# Patient Record
Sex: FEMALE | Race: WHITE | Employment: OTHER | URBAN - METROPOLITAN AREA
[De-identification: names, ages, dates, MRNs, and addresses within clinical notes are randomized per-mention and may not be internally consistent; named-entity substitution may affect disease eponyms.]

---

## 2020-03-04 NOTE — TELEPHONE ENCOUNTER
Per pt has questions regarding a procedure, cartiva implant. Is wanting to know how many cases he has had, wanting an overview. Was referred by Dr. Tanya Tong colleague,Dr. Jennifer Buchanan.  Please advise

## 2020-03-08 NOTE — TELEPHONE ENCOUNTER
Please let the patient know that I no longer use the Cartee of a I have not like the results I usually recommend george-implant but I would have to evaluate the patient first take x-rays and assess and see exactly what type of surgery she needs thank you

## 2020-03-09 NOTE — TELEPHONE ENCOUNTER
S/w pt. She is picking her old records today, will bring to her appt.  Time and address confirmed for Thursday appt

## 2020-03-09 NOTE — TELEPHONE ENCOUNTER
I called the patient and I spoke to her extensively about what she had done. She has an appointment with me this Thursday should get as much clinical information from her former doctor.

## 2020-03-09 NOTE — TELEPHONE ENCOUNTER
Dr Bhavana Pathak - pls see below    S/w pt. Relayed Dr Toro Goldstein. Pt is requesting phone call from Dr Bhavana Pathak to discuss as she is a good friend of Dr. Velia Freeman.   Pt is asking about the permanence of a george implant vs cartiva, why not cartiva, what

## 2023-09-10 NOTE — PROGRESS NOTES
Loree Plaza is a 58year old female. Patient presents with: Foot Pain: Right great toe- bone on bone- pain comes and goes- rates pain 4/10 . HPI:   This very pleasant patient presents to the clinic with a chief complaint of right great toe pain its been coming and going about 4-10 but can get worse with prolonged standing and walking she also complains of a bump on the side of her great toe joint on the right foot she does have a history of previous cheilectomy with another physician. She relates that she had the cheilectomy done pain got worse and she developed arthritis at the great toe joint. At today's visit reviewed nurse's history as taken above, allergies medications and medical history as documented below. All changes duly noted  Allergies: Patient has no known allergies. Current Outpatient Medications   Medication Sig Dispense Refill    LORazepam (ATIVAN) 2 MG Oral Tab Take 1 tablet (2 mg total) by mouth every 6 (six) hours as needed. Tazarotene (TAZORAC EX) Apply  topically. Citalopram Hydrobromide (CELEXA) 40 MG Oral Tab Take 40 mg by mouth daily. mupirocin (BACTROBAN) 2 % Apply Externally Ointment Mix with triamcinolone ointment and apply bid 30 g 1    triamcinolone acetonide (KENALOG) 0.1 % Apply Externally Ointment Apply to aa bid ( mix with mupirocin) 90 g 2    trazodone (DESYREL) 50 MG Oral Tab Take 50 mg by mouth nightly. Clobetasol Propionate (CLOBEX SPRAY EX) Apply  topically. History reviewed. No pertinent past medical history. History reviewed. No pertinent surgical history. History reviewed. No pertinent family history.    Social History    Socioeconomic History      Marital status: Single          REVIEW OF SYSTEMS:   Today reviewed systens as documented below  GENERAL HEALTH: feels well otherwise  SKIN: Refer to exam below  RESPIRATORY: denies shortness of breath with exertion  CARDIOVASCULAR: denies chest pain on exertion  GI: denies abdominal pain and denies heartburn  NEURO: denies headaches    EXAM:   There were no vitals taken for this visit. GENERAL: well developed, well nourished, in no apparent distress  EXTREMITIES:   1. Integument: The skin on the right foot was evaluated its warm and dry she has a surgical scar over the first MTP joint. She has a mildly enlarged medial eminence of the first metatarsal head as well. She has some swelling around the great toe joint. 2. Vascular: Patient has palpable pulses dorsalis pedis and posterior tibial on the right   3. Neurologic: Patient has intact sensorium   4. Musculoskeletal: Patient has pain and extremely limited range of motion of the first MTP joint of the right foot which is guarding and splinting secondary to pain. There is crepitation noted. The first MTP joint of the left foot also shows some guarding and splinting but not as severe range of motion is relatively good there. X-rays were taken AP and lateral showing osteoarthritic changes of the first MTP joint the dorsal eminence of the first metatarsal has been removed so that looks moved and the joint is a squared off type which has a propensity for developing osteoarthritis. The sesamoids are in a fairly good position. But the medial sesamoid seems to have some mottled appearance on both the AP and the lateral views there is some osteoarthritic changes noted to the sesamoid. X-rays were taken 2 views of the left foot again showing some mild osteoarthritic changes of the first MTP joint. 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 In addition to that my recommendation here would be for a fusion of the first MTP joint on the right foot with plate and screws. This is for several reasons:  First there is significant osteoarthritic changes noted to the first MTP joint of the right foot. This is especially present on the lateral aspect where there is osteophytic spurring of the joint. I discussed with her that a Kelechi implant is probably not going to be beneficial because that will increase the motion of the first MTP joint which is what she is desirous of however the sesamoid is arthritic and this would increase plantar pain and swelling eventually again requiring a fusion. The nature and extent of the fusion procedure the possible complications and risks postoperative recovery and necessity for nonweightbearing in the length of time for nonweightbearing were all reviewed. Questions were invited and answered the patient can call me back with questions or MyChart message me. The patient indicates understanding of these issues and agrees to the plan.     Sebastien Villanueva DPM

## 2023-09-19 NOTE — TELEPHONE ENCOUNTER
Received fax from [facility] for Prior authorization started for tramadol HCI 50mg tablets.  Please advise

## 2023-09-20 NOTE — TELEPHONE ENCOUNTER
Received another fax from [facility] with note that pt is in severe pain and would like to switch medications. Sent fax to clinical staff. Please advise.

## 2023-10-19 NOTE — TELEPHONE ENCOUNTER
Pt calling asking for refill on the medication pt leaving for out of town in the morning 11 am please advise

## 2023-10-19 NOTE — TELEPHONE ENCOUNTER
Rx request for Tramadol. Please review and sign off if appropriate. Thank you.     Last seen: 9/6/23  Last refill: 9/21/23 # 30 with 0 reflls

## 2023-10-19 NOTE — TELEPHONE ENCOUNTER
Rx request for tramadol. Please review and sign off if appropriate. Thank you.     Last seen: 9/6/23  Last refill: 9/19/23 # 30 with 0 refills

## 2023-10-25 NOTE — TELEPHONE ENCOUNTER
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

## 2023-10-25 NOTE — TELEPHONE ENCOUNTER
Patient calling to follow up on getting her refill today. Patient states that she is out of her medication for 1 week now and that she has sent message through [address] [place] [address] [phone] and called our office a few times. Patient wants to know why it is taking about a week? Patient is very upset and wants a call back from someone higher and who cares.

## 2023-10-26 NOTE — TELEPHONE ENCOUNTER
Spoke to the patient and she she expressed that she would like her medication in more timely manner. I apologized for the delayed in the refill of the medication. Patient verbalized understanding.

## 2023-11-29 NOTE — TELEPHONE ENCOUNTER
Patient calling for medication refill   TRAMADOL 50 MG Oral Tab   And would like to speak to the supervisor regarding medication refill that she had call since Friday and hasn't got her refill. Pt mention Juan Hopkins.   Please advise

## 2023-12-01 NOTE — TELEPHONE ENCOUNTER
Last rx given on 10/25 #30 no refill  Last seen on 9/6/23  Pt scheduled for sx on 2/28.    Please advise on refill

## 2023-12-04 NOTE — TELEPHONE ENCOUNTER
Fax received from [address] [place] [address] stating prior authorization has been started for Tramadol HCI 50 MG. Please advise.

## 2023-12-04 NOTE — TELEPHONE ENCOUNTER
Called [place] and they informed me that the tramadol does not require a prior auth and insurance covered the med and ready for  for pt. Disregard PA fax request. Nothing further needed.

## 2023-12-11 NOTE — TELEPHONE ENCOUNTER
From: Latisha Long  To: Wang Barney  Sent: 12/10/2023 6:15 PM CST  Subject: Question re: Tramadol for Dr Jara    Can Dr Jara or his nurse please call me? Thank you.

## 2023-12-11 NOTE — TELEPHONE ENCOUNTER
S/w pt and she states her last refill request took 10 days to fill and wants to know why it took so long. I apologized for the delay as her last request came in during thanksgiving week and we had longer than normal wait times. She wanted to know the best method to contact us in the future and I advised she can send a mychart encounter but if she has a more urgent concern she should call the office. She verbalized understanding.

## 2024-01-05 NOTE — TELEPHONE ENCOUNTER
Pt calling asking for a refill on the following medication,Pt only wants to deal with Payal only states she wants her to do because payal said to call her only said doesn't want it to take 10 days. Pt wants payal to call her back please advise      traMADol 50 MG Oral Tab

## 2024-01-05 NOTE — TELEPHONE ENCOUNTER
Last seen on 9/6/23  Last rx given on 12/3/23 #30 no refill  Pt scheduled for right foot sx on 2/28/23.  Please advise on refill request

## 2024-02-20 RX ORDER — LORAZEPAM 1 MG/1
1 TABLET ORAL AS NEEDED
COMMUNITY

## 2024-02-20 RX ORDER — MIRTAZAPINE 15 MG/1
15 TABLET, FILM COATED ORAL NIGHTLY
COMMUNITY

## 2024-02-20 RX ORDER — TRAMADOL HYDROCHLORIDE 50 MG/1
50 TABLET ORAL EVERY 6 HOURS PRN
Status: ON HOLD | COMMUNITY
End: 2024-02-28

## 2024-02-20 RX ORDER — QUETIAPINE FUMARATE 25 MG/1
25 TABLET, FILM COATED ORAL AS NEEDED
COMMUNITY

## 2024-02-20 RX ORDER — MULTIVIT-MIN/IRON FUM/FOLIC AC 7.5 MG-4
1 TABLET ORAL DAILY
COMMUNITY

## 2024-02-27 ENCOUNTER — ANESTHESIA EVENT (OUTPATIENT)
Dept: SURGERY | Facility: HOSPITAL | Age: 64
End: 2024-02-27
Payer: MEDICAID

## 2024-02-27 NOTE — DISCHARGE INSTRUCTIONS
HOME INSTRUCTIONS  Keep cast clean and dry. You can shower in 24 hours, make sure to cover the cast so it remains dry. Non-weight bearing on the right foot. Use crutches until your follow-up appointment. Watch for any signs of infection. Shortness of breathe, pain not being controlled by the medication, any excessive drainage coming from the cast. Keep an eye out on the toes, make sure they have good circulation.   AMBSURG HOME CARE INSTRUCTIONS: POST-OP ANESTHESIA  The medication that you received for sedation or general anesthesia can last up to 24 hours. Your judgment and reflexes may be altered, even if you feel like your normal self.      We Recommend:   Do not drive any motor vehicle or bicycle   Avoid mowing the lawn, playing sports, or working with power tools/applicances (power saws, electric knives or mixers)   That you have someone stay with you on your first night home   Do not drink alcohol or take sleeping pills or tranquilizers   Do not sign legal documents within 24 hours of your procedure   If you had a nerve block for your surgery, take extra care not to put any pressure on your arm or hand for 24 hours    It is normal:  For you to have a sore throat if you had a breathing tube during surgery (while you were asleep!). The sore throat should get better within 48 hours. You can gargle with warm salt water (1/2 tsp in 4 oz warm water) or use a throat lozenge for comfort  To feel muscle aches or soreness especially in the abdomen, chest or neck. The achy feeling should go away in the next 24 hours  To feel weak, sleepy or \"wiped out\". Your should start feeling better in the next 24 hours.   To experience mild discomforts such as sore lip or tongue, headache, cramps, gas pains or a bloated feeling in your abdomen.   To experience mild back pain or soreness for a day or two if you had spinal or epidural anesthesia.   If you had laparoscopic surgery, to feel shoulder pain or discomfort on the day of  surgery.   For some patients to have nausea after surgery/anesthesia    If you feel nausea or experience vomiting:   Try to move around less.   Eat less than usual or drink only liquids until the next morning   Nausea should resolve in about 24 hours    If you have a problem when you are at home:    Call your surgeons office   Discharge Instructions: After Your Surgery  You’ve just had surgery. During surgery, you were given medicine called anesthesia to keep you relaxed and free of pain. After surgery, you may have some pain or nausea. This is common. Here are some tips for feeling better and getting well after surgery.   Going home  Your healthcare provider will show you how to take care of yourself when you go home. They'll also answer your questions. Have an adult family member or friend drive you home. For the first 24 hours after your surgery:   Don't drive or use heavy equipment.  Don't make important decisions or sign legal papers.  Take medicines as directed.  Don't drink alcohol.  Have someone stay with you, if needed. They can watch for problems and help keep you safe.  Be sure to go to all follow-up visits with your healthcare provider. And rest after your surgery for as long as your provider tells you to.   Coping with pain  If you have pain after surgery, pain medicine will help you feel better. Take it as directed, before pain becomes severe. Also, ask your healthcare provider or pharmacist about other ways to control pain. This might be with heat, ice, or relaxation. And follow any other instructions your surgeon or nurse gives you.      Stay on schedule with your medicine.     Tips for taking pain medicine  To get the best relief possible, remember these points:   Pain medicines can upset your stomach. Taking them with a little food may help.  Most pain relievers taken by mouth need at least 20 to 30 minutes to start to work.  Don't wait till your pain becomes severe before you take your medicine.  Try to time your medicine so that you can take it before starting an activity. This might be before you get dressed, go for a walk, or sit down for dinner.  Constipation is a common side effect of some pain medicines. Call your healthcare provider before taking any medicines such as laxatives or stool softeners to help ease constipation. Also ask if you should skip any foods. Drinking lots of fluids and eating foods such as fruits and vegetables that are high in fiber can also help. Remember, don't take laxatives unless your surgeon has prescribed them.  Drinking alcohol and taking pain medicine can cause dizziness and slow your breathing. It can even be deadly. Don't drink alcohol while taking pain medicine.  Pain medicine can make you react more slowly to things. Don't drive or run machinery while taking pain medicine.  Your healthcare provider may tell you to take acetaminophen to help ease your pain. Ask them how much you're supposed to take each day. Acetaminophen or other pain relievers may interact with your prescription medicines or other over-the-counter (OTC) medicines. Some prescription medicines have acetaminophen and other ingredients in them. Using both prescription and OTC acetaminophen for pain can cause you to accidentally overdose. Read the labels on your OTC medicines with care. This will help you to clearly know the list of ingredients, how much to take, and any warnings. It may also help you not take too much acetaminophen. If you have questions or don't understand the information, ask your pharmacist or healthcare provider to explain it to you before you take the OTC medicine.   Managing nausea  Some people have an upset stomach (nausea) after surgery. This is often because of anesthesia, pain, or pain medicine, less movement of food in the stomach, or the stress of surgery. These tips will help you handle nausea and eat healthy foods as you get better. If you were on a special food plan before  surgery, ask your healthcare provider if you should follow it while you get better. Check with your provider on how your eating should progress. It may depend on the surgery you had. These general tips may help:   Don't push yourself to eat. Your body will tell you when to eat and how much.  Start off with clear liquids and soup. They're easier to digest.  Next try semi-solid foods as you feel ready. These include mashed potatoes, applesauce, and gelatin.  Slowly move to solid foods. Don’t eat fatty, rich, or spicy foods at first.  Don't force yourself to have 3 large meals a day. Instead eat smaller amounts more often.  Take pain medicines with a small amount of solid food, such as crackers or toast. This helps prevent nausea.  When to call your healthcare provider  Call your healthcare provider right away if you have any of these:   You still have too much pain, or the pain gets worse, after taking the medicine. The medicine may not be strong enough. Or there may be a complication from the surgery.  You feel too sleepy, dizzy, or groggy. The medicine may be too strong.  Side effects such as nausea or vomiting. Your healthcare provider may advise taking other medicines to .  Skin changes such as rash, itching, or hives. This may mean you have an allergic reaction. Your provider may advise taking other medicines.  The incision looks different (for instance, part of it opens up).  Bleeding or fluid leaking from the incision site, and weren't told to expect that.  Fever of 100.4°F (38°C) or higher, or as directed by your provider.  Call 911  Call 911 right away if you have:   Trouble breathing  Facial swelling    If you have obstructive sleep apnea   You were given anesthesia medicine during surgery to keep you comfortable and free of pain. After surgery, you may have more apnea spells because of this medicine and other medicines you were given. The spells may last longer than normal.    At home:  Keep using the  continuous positive airway pressure (CPAP) device when you sleep. Unless your healthcare provider tells you not to, use it when you sleep, day or night. CPAP is a common device used to treat obstructive sleep apnea.  Talk with your provider before taking any pain medicine, muscle relaxants, or sedatives. Your provider will tell you about the possible dangers of taking these medicines.  Contact your provider if your sleeping changes a lot even when taking medicines as directed.  Lee last reviewed this educational content on 10/1/2021  © 2143-2161 [employer]. All rights reserved. This information is not intended as a substitute for professional medical care. Always follow your healthcare professional's instructions.

## 2024-02-28 ENCOUNTER — HOSPITAL ENCOUNTER (EMERGENCY)
Facility: HOSPITAL | Age: 64
Discharge: HOME | End: 2024-02-28
Attending: EMERGENCY MEDICINE
Payer: MEDICAID

## 2024-02-28 ENCOUNTER — HOSPITAL ENCOUNTER (OUTPATIENT)
Facility: HOSPITAL | Age: 64
Setting detail: HOSPITAL OUTPATIENT SURGERY
Discharge: HOME | End: 2024-02-28
Attending: PODIATRIST | Admitting: PODIATRIST
Payer: MEDICAID

## 2024-02-28 ENCOUNTER — APPOINTMENT (OUTPATIENT)
Dept: GENERAL RADIOLOGY | Facility: HOSPITAL | Age: 64
End: 2024-02-28
Attending: PODIATRIST
Payer: MEDICAID

## 2024-02-28 ENCOUNTER — TELEPHONE (OUTPATIENT)
Dept: PODIATRY CLINIC | Facility: CLINIC | Age: 64
End: 2024-02-28

## 2024-02-28 ENCOUNTER — ANESTHESIA (OUTPATIENT)
Dept: SURGERY | Facility: HOSPITAL | Age: 64
End: 2024-02-28
Payer: MEDICAID

## 2024-02-28 VITALS
HEART RATE: 64 BPM | SYSTOLIC BLOOD PRESSURE: 121 MMHG | OXYGEN SATURATION: 98 % | TEMPERATURE: 99 F | BODY MASS INDEX: 24.86 KG/M2 | DIASTOLIC BLOOD PRESSURE: 79 MMHG | HEIGHT: 68 IN | RESPIRATION RATE: 16 BRPM | WEIGHT: 164 LBS

## 2024-02-28 VITALS
DIASTOLIC BLOOD PRESSURE: 85 MMHG | OXYGEN SATURATION: 99 % | RESPIRATION RATE: 18 BRPM | HEART RATE: 82 BPM | SYSTOLIC BLOOD PRESSURE: 144 MMHG | TEMPERATURE: 98 F

## 2024-02-28 DIAGNOSIS — Z98.890 STATUS POST FOOT SURGERY: Primary | ICD-10-CM

## 2024-02-28 DIAGNOSIS — G89.18 POSTOPERATIVE PAIN: Primary | ICD-10-CM

## 2024-02-28 DIAGNOSIS — G89.18 ACUTE POSTOPERATIVE PAIN: ICD-10-CM

## 2024-02-28 PROCEDURE — 76000 FLUOROSCOPY <1 HR PHYS/QHP: CPT | Performed by: PODIATRIST

## 2024-02-28 PROCEDURE — 96374 THER/PROPH/DIAG INJ IV PUSH: CPT

## 2024-02-28 PROCEDURE — 28750 FUSION OF BIG TOE JOINT: CPT | Performed by: PODIATRIST

## 2024-02-28 PROCEDURE — 0QBN0ZZ EXCISION OF RIGHT METATARSAL, OPEN APPROACH: ICD-10-PCS | Performed by: PODIATRIST

## 2024-02-28 PROCEDURE — 0SGM04Z FUSION OF RIGHT METATARSAL-PHALANGEAL JOINT WITH INTERNAL FIXATION DEVICE, OPEN APPROACH: ICD-10-PCS | Performed by: PODIATRIST

## 2024-02-28 PROCEDURE — 99284 EMERGENCY DEPT VISIT MOD MDM: CPT

## 2024-02-28 PROCEDURE — 0QSN04Z REPOSITION RIGHT METATARSAL WITH INTERNAL FIXATION DEVICE, OPEN APPROACH: ICD-10-PCS | Performed by: PODIATRIST

## 2024-02-28 PROCEDURE — 96375 TX/PRO/DX INJ NEW DRUG ADDON: CPT

## 2024-02-28 DEVICE — SCREW BNE 3.5X14MM ANK CORT FT LOK ORTHOLOC: Type: IMPLANTABLE DEVICE | Site: TOE | Status: FUNCTIONAL

## 2024-02-28 DEVICE — PROPEL PUTTY, SMALL
Type: IMPLANTABLE DEVICE | Site: TOE | Status: FUNCTIONAL
Brand: PROPEL

## 2024-02-28 DEVICE — IMPLANTABLE DEVICE: Type: IMPLANTABLE DEVICE | Site: TOE

## 2024-02-28 DEVICE — HOLDING PIN
Type: IMPLANTABLE DEVICE | Site: TOE
Brand: ANCHORAGE

## 2024-02-28 DEVICE — IMPLANTABLE DEVICE: Type: IMPLANTABLE DEVICE | Site: TOE | Status: FUNCTIONAL

## 2024-02-28 RX ORDER — BUPIVACAINE HYDROCHLORIDE 5 MG/ML
INJECTION, SOLUTION EPIDURAL; INTRACAUDAL AS NEEDED
Status: DISCONTINUED | OUTPATIENT
Start: 2024-02-28 | End: 2024-02-28 | Stop reason: HOSPADM

## 2024-02-28 RX ORDER — ACETAMINOPHEN 500 MG
1000 TABLET ORAL ONCE
Status: COMPLETED | OUTPATIENT
Start: 2024-02-28 | End: 2024-02-28

## 2024-02-28 RX ORDER — SODIUM CHLORIDE, SODIUM LACTATE, POTASSIUM CHLORIDE, CALCIUM CHLORIDE 600; 310; 30; 20 MG/100ML; MG/100ML; MG/100ML; MG/100ML
INJECTION, SOLUTION INTRAVENOUS CONTINUOUS
Status: DISCONTINUED | OUTPATIENT
Start: 2024-02-28 | End: 2024-02-28

## 2024-02-28 RX ORDER — AMOXICILLIN AND CLAVULANATE POTASSIUM 875; 125 MG/1; MG/1
1 TABLET, FILM COATED ORAL 2 TIMES DAILY
Qty: 14 TABLET | Refills: 0 | Status: SHIPPED
Start: 2024-02-28

## 2024-02-28 RX ORDER — HYDROMORPHONE HYDROCHLORIDE 1 MG/ML
0.2 INJECTION, SOLUTION INTRAMUSCULAR; INTRAVENOUS; SUBCUTANEOUS EVERY 5 MIN PRN
Status: DISCONTINUED | OUTPATIENT
Start: 2024-02-28 | End: 2024-02-28

## 2024-02-28 RX ORDER — HYDROMORPHONE HYDROCHLORIDE 1 MG/ML
0.5 INJECTION, SOLUTION INTRAMUSCULAR; INTRAVENOUS; SUBCUTANEOUS ONCE
Status: COMPLETED | OUTPATIENT
Start: 2024-02-28 | End: 2024-02-28

## 2024-02-28 RX ORDER — KETOROLAC TROMETHAMINE 15 MG/ML
15 INJECTION, SOLUTION INTRAMUSCULAR; INTRAVENOUS ONCE
Status: COMPLETED | OUTPATIENT
Start: 2024-02-28 | End: 2024-02-28

## 2024-02-28 RX ORDER — ORPHENADRINE CITRATE 30 MG/ML
30 INJECTION INTRAMUSCULAR; INTRAVENOUS ONCE
Status: COMPLETED | OUTPATIENT
Start: 2024-02-28 | End: 2024-02-28

## 2024-02-28 RX ORDER — OXYCODONE AND ACETAMINOPHEN 10; 325 MG/1; MG/1
TABLET ORAL EVERY 6 HOURS PRN
Qty: 10 TABLET | Refills: 0 | Status: SHIPPED | OUTPATIENT
Start: 2024-02-28 | End: 2024-03-04

## 2024-02-28 RX ORDER — NALOXONE HYDROCHLORIDE 0.4 MG/ML
80 INJECTION, SOLUTION INTRAMUSCULAR; INTRAVENOUS; SUBCUTANEOUS AS NEEDED
Status: DISCONTINUED | OUTPATIENT
Start: 2024-02-28 | End: 2024-02-28

## 2024-02-28 RX ORDER — KETOROLAC TROMETHAMINE 10 MG/1
10 TABLET, FILM COATED ORAL EVERY 6 HOURS PRN
Qty: 20 TABLET | Refills: 0 | Status: SHIPPED | OUTPATIENT
Start: 2024-02-28 | End: 2024-03-06

## 2024-02-28 RX ORDER — MIDAZOLAM HYDROCHLORIDE 1 MG/ML
INJECTION INTRAMUSCULAR; INTRAVENOUS AS NEEDED
Status: DISCONTINUED | OUTPATIENT
Start: 2024-02-28 | End: 2024-02-28 | Stop reason: SURG

## 2024-02-28 RX ORDER — HYDROCODONE BITARTRATE AND ACETAMINOPHEN 5; 325 MG/1; MG/1
1 TABLET ORAL EVERY 6 HOURS PRN
Qty: 30 TABLET | Refills: 0 | Status: SHIPPED | OUTPATIENT
Start: 2024-02-28

## 2024-02-28 RX ORDER — LIDOCAINE HYDROCHLORIDE 10 MG/ML
INJECTION, SOLUTION EPIDURAL; INFILTRATION; INTRACAUDAL; PERINEURAL AS NEEDED
Status: DISCONTINUED | OUTPATIENT
Start: 2024-02-28 | End: 2024-02-28 | Stop reason: SURG

## 2024-02-28 RX ORDER — CEFAZOLIN SODIUM/WATER 2 G/20 ML
2 SYRINGE (ML) INTRAVENOUS ONCE
Status: COMPLETED | OUTPATIENT
Start: 2024-02-28 | End: 2024-02-28

## 2024-02-28 RX ADMIN — CEFAZOLIN SODIUM/WATER 2 G: 2 G/20 ML SYRINGE (ML) INTRAVENOUS at 07:38:00

## 2024-02-28 RX ADMIN — SODIUM CHLORIDE, SODIUM LACTATE, POTASSIUM CHLORIDE, CALCIUM CHLORIDE: 600; 310; 30; 20 INJECTION, SOLUTION INTRAVENOUS at 07:37:00

## 2024-02-28 RX ADMIN — SODIUM CHLORIDE, SODIUM LACTATE, POTASSIUM CHLORIDE, CALCIUM CHLORIDE: 600; 310; 30; 20 INJECTION, SOLUTION INTRAVENOUS at 09:18:00

## 2024-02-28 RX ADMIN — LIDOCAINE HYDROCHLORIDE 2 ML: 10 INJECTION, SOLUTION EPIDURAL; INFILTRATION; INTRACAUDAL; PERINEURAL at 07:37:00

## 2024-02-28 RX ADMIN — MIDAZOLAM HYDROCHLORIDE 2 MG: 1 INJECTION INTRAMUSCULAR; INTRAVENOUS at 07:37:00

## 2024-02-28 NOTE — ANESTHESIA PREPROCEDURE EVALUATION
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 [M19.079]    Relevant Problems   No relevant active problems       NPO:  Last Liquid Consumption Date: 24  Last Liquid Consumption Time:   Last Solid Consumption Date: 24  Last Solid Consumption Time:   Last Liquid Consumption Date: 24          History Review:  Patient Active Problem List    Diagnosis Date Noted    Allergic contact dermatitis due to cosmetics 2013    Other psoriasis 2013       Past Medical History:   Diagnosis Date    Anxiety state     Depression     Dry eye     Visual impairment     glasses       Past Surgical History:   Procedure Laterality Date          FOOT SURGERY Right     IMPLANT LEFT      IMPLANT RIGHT         Medications Prior to Admission   Medication Sig Dispense Refill Last Dose    Multiple Vitamins-Minerals (MULTI-VITAMIN/MINERALS) Oral Tab Take 1 tablet by mouth daily.   2024 at 0800    Pyridoxine HCl (VITAMIN B-6 OR) Take 1 tablet by mouth daily.   2024 at 0800    mirtazapine 15 MG Oral Tab Take 1 tablet (15 mg total) by mouth nightly.   2024 at 0800    LORazepam 1 MG Oral Tab Take 1 tablet (1 mg total) by mouth as needed for Anxiety.   2024    QUEtiapine 25 MG Oral Tab Take 1 tablet (25 mg total) by mouth as needed.   2024 at 2100    traMADol 50 MG Oral Tab Take 1 tablet (50 mg total) by mouth every 6 (six) hours as needed for Pain.   2024    traMADol 50 MG Oral Tab Take 1 tablet (50 mg total) by mouth every 12 (twelve) hours as needed for Pain. 30 tablet 0  2/26/2024    traMADol 50 MG Oral Tab Take 1 tablet (50 mg total) by mouth every 12 (twelve) hours as needed for Pain. 30 tablet 0     tramadol-acetaminophen 37.5-325 MG Oral Tab Take 1 tablet by mouth every 6 (six) hours as needed for Pain. 30 tablet 0      Current Facility-Administered Medications Ordered in Epic   Medication Dose Route Frequency Provider Last Rate Last Admin    lactated ringers infusion   Intravenous Continuous Ming Barney DPM        acetaminophen (Tylenol Extra Strength) tab 1,000 mg  1,000 mg Oral Once Ming Barney DPM        ceFAZolin (Ancef) 2 g in 20mL IV syringe premix  2 g Intravenous Once Ming Barney DPM         No current [facility]-ordered outpatient medications on file.       Allergies   Allergen Reactions    Latex UNKNOWN     Added based on information entered during case entry, please review and add reactions, type, and severity as needed, per patient not an allergy       Family History   Problem Relation Age of Onset    Heart Disorder Father      Social History     Socioeconomic History    Marital status: Single   Tobacco Use    Smoking status: Never    Smokeless tobacco: Never   Vaping Use    Vaping Use: Never used   Substance and Sexual Activity    Alcohol use: Yes     Comment: 3 drinks monthly    Drug use: Not Currently       Available pre-op labs reviewed.             Vital Signs:  Body mass index is 24.94 kg/m².   height is 1.727 m (5' 8\") and weight is 74.4 kg (164 lb). Her oral temperature is 98.2 °F (36.8 °C). Her blood pressure is 150/73 and her pulse is 86. Her respiration is 20 and oxygen saturation is 93%.   Vitals:    02/20/24 1459 02/28/24 0659   BP:  150/73   Pulse:  86   Resp:  20   Temp:  98.2 °F (36.8 °C)   TempSrc:  Oral   SpO2:  93%   Weight: 76.2 kg (168 lb) 74.4 kg (164 lb)   Height: 1.727 m (5' 8\") 1.727 m (5' 8\")        Anesthesia Evaluation     Patient summary reviewed and Nursing notes reviewed    Airway   Mallampati: III  Dental - Dentition  appears grossly intact     Pulmonary - negative ROS    breath sounds clear to auscultation  Cardiovascular - negative ROS  Exercise tolerance: good    Rhythm: regular  Rate: normal    Neuro/Psych    (+)  anxiety/panic attacks,  depression      GI/Hepatic/Renal - negative ROS     Endo/Other - negative ROS   Abdominal                  Anesthesia Plan:   ASA:  2  Plan:   General  Plan Comments: Deep sedation vs GA discussed. Pt ok with either but prefers DS if surgically possible.  Will discuss with [place].  Informed Consent Plan and Risks Discussed With:  Patient      I have informed Hui Lizama Juliana Wes and/or legal guardian or family member of the nature of the anesthetic plan, benefits, risks including possible dental damage if relevant, major complications, and any alternative forms of anesthetic management.   All of the patient's questions were answered to the best of my ability. The patient desires the anesthetic management as planned.  JUAN KIRBY MD  2/28/2024 7:11 AM  Present on Admission:  **None**

## 2024-02-28 NOTE — TELEPHONE ENCOUNTER
Per pt had surgery this morning, took 2 hydrocodone at 1:30pm and her foot is still throbbing and asking if she can take another. Please advise

## 2024-02-28 NOTE — ANESTHESIA POSTPROCEDURE EVALUATION
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    [facility] AN Post Evaluation:   Patient Evaluated in PACU  Patient Participation: complete - patient participated  Level of Consciousness: awake  Pain Score: 0  Pain Management: adequate  Airway Patency:patent  Dental exam unchanged from preop  Yes    Cardiovascular Status: acceptable  Respiratory Status: acceptable  Postoperative Hydration acceptable      JUAN KIRBY MD  2/28/2024 9:32 AM

## 2024-02-28 NOTE — TELEPHONE ENCOUNTER
S/w patient- she states that she had surgery today and foot is starting to throb. She took 2 norcos at 1:30pm. She is wondering if she can take advil or aleve.    S/w Dr Barney and he states that aleve or advil would be good option in between taking norco.    S/w patient and informed her that she could take the advil or aleve in the in between norco. She should also ice and elevate the foot. She states that it was more painful than she thought. I informed her that pain will start to improve over the next week as the swelling goes down. She had no other questions at this time.    TONEY

## 2024-02-28 NOTE — BRIEF OP NOTE
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     Specimen: None     Estimated Blood Loss: 20 cc    Dictation Number: In epic    Ming Barney DPM  2/28/2024  9:40 AM

## 2024-02-28 NOTE — OPERATIVE REPORT
[facility]     OPERATIVE REPORT    Hui Harvey    CSN [id number] MRN [id number]    10/19/1960 Age 63 year old   Admission Date 2024 Operation Date 2024   Attending Physician Rashmi story. providers found Operating Physician Ming Barney, DPM   PCP SAQIB CULLEN             PREOPERATIVE DIAGNOSIS:    Hallux limitus first metatarsal phalangeal joint right foot capsulitis first metatarsal phalangeal joint right foot  Arthritis of first metatarsal phalangeal joint of the great toe, right foot  POSTOPERATIVE DIAGNOSIS: Same  PROCEDURE:    Arthrodesis of the first metatarsal phalangeal joint utilizing a [employer] 6-hole plate with locking screws and 1 nonlocking for the compression's slot.  And insertion of 1 cc of DBM  Application of below the knee fiberglass cast right lower extremity with bivalving    ANESTHESIA:  Local with light sedation, utilizing 0.5% Marcaine plain.  16 cc via medial foot block and posterior tibial nerve block right foot     HEMOSTASIS: Pneumatic ankle tourniquet inflated to 250 mmHg following exsanguination Chaim's bandage right lower extremity     ESTIMATED BLOOD LOSS: 20 cc     INDICATIONS:  This 63 year old female presented with patient had a cheilectomy procedure was failed.  She was met in preoperative holding so we could again review her postoperative recovery necessity for nonweightbearing until instructed otherwise.  FINDINGS: Diffuse full-thickness erosions over the adjacent sides of the metatarsal phalangeal joint over 70% and partial thickness on the rest.  The articular cartilage overall was discolored.     SPECIMENS: None     COMPLICATIONS:  None.     DRAINS: None     OPERATIVE TECHNIQUE:    The patient was brought into the operating with vital signs stable and placed in a supine position on the operating table with all personnel present and the implants necessary for the fusion were in the room along with the representative from the [employer]  company.  Timeout was then taken there were no additions deletions or concerns reported.  The patient was placed under intravenous sedation and the right foot was anesthetized as above then prepped and draped using usual aseptic technique and hemostasis was achieved as above.  Medial linear incision was made beginning midshaft over the first metatarsal medial to the tendon of extensor hallucis longus in line with the previous cheilectomy incision.  The incision was lengthened and carried over the proximal phalanx to the level of the head.  The incision was deepened using both sharp and blunt technique superficial veins were ligated cauterized and/or retracted as necessary deep vital structures were underscored and Safely retracted during the course of the procedure.  Next a capsular and periosteal incision was made the full length of the skin incision and the capsular and periosteal structures were denuded from the first metatarsal dorsally medially laterally and plantarly using a McGlamry elevator.  The tissues were denuded from the base of the proximal phalanx dorsally medially and laterally and any exposed bone was resected utilizing a rongeur's.  Next utilizing the reaming system guidewire was driven on the first metatarsal head where the reaming system was used to create around peg like structure for the metatarsal head and was drilled then with a 2 mm drill bit for fenestration.  Reaming system was then used on the base of the phalanx where slightly more bone was taken to accommodate for the fact that the patient wanted to be able to have better dorsiflexion.  He was then fenestrated with a 2 mm drill bit on the base of the phalanx.  At this point in time the areas flushed with copious amounts of saline resection sites look to be good on fluoroscopy and was then fixated with a plate which had a 10 degree dorsiflexion bend.  3 screws were placed into the base of the phalanx.  And then the compression screw was  inserted proximally and while maintaining compression that screw was tightened and the fusion site compressed very nicely.  2 other stability screws were put in the remaining holes of the plate.  An interfrag screw was not thrown.  He was used to visualize correction was found to be very good that image was saved and sent to radiology.  The subcutaneous tissues and periosteal and capsular structures were reapproximated and maintained utilizing 3-0 Vicryl suture in simple interrupted fashion.  Skin edges were reapproximated maintained using 3-0 nylon suture in a simple interrupted fashion.  A sterile postoperative dressing was applied with Xeroform sterile gauze 3 inch Bre..  At this point in time the medic ankle tourniquet been released earlier and hemostasis had been controlled with electrocautery.  A below the knee fiberglass cast was then applied and bivalved to accommodate for any swelling that could occur.  The patient tolerated the above anesthesia procedure well left the operating with vital signs stable and the vascular status of her right foot intact to recovery room via cart.    Ming Barney DPM

## 2024-02-29 NOTE — ED INITIAL ASSESSMENT (HPI)
Pt to ED for post op pain to right foot. Per pt, she had right foot surgery earlier today and is in severe pain. Pt has sensation, cap refill <2 seconds, right leg is in cast.

## 2024-02-29 NOTE — TELEPHONE ENCOUNTER
I had the patient go to the [facility] ER for evaluation because her pain was so severe.  They switched her to Percocet and Toradol.  I have since talked to the patient and she is doing better.

## 2024-02-29 NOTE — ED PROVIDER NOTES
Patient Seen in: [facility] Emergency Department      History     Chief Complaint   Patient presents with    Postop/Procedure Problem     Stated Complaint: post-op pain    Subjective:   HPI    63-year-old female presents for evaluation for postoperative pain.  Patient had surgery on her right foot today and has been having severe pain.  She was prescribed hydrocodone 5-325.  She has been taking up to 2 of these at a time and is still having severe pain.  Pain comes in waves.  Pain is throbbing.    Objective:   Past Medical History:   Diagnosis Date    Anxiety state     Depression     Dry eye     Visual impairment     glasses              Past Surgical History:   Procedure Laterality Date          FOOT SURGERY Right     IMPLANT LEFT      IMPLANT RIGHT                  Social History     Socioeconomic History    Marital status: Single   Tobacco Use    Smoking status: Never    Smokeless tobacco: Never   Vaping Use    Vaping Use: Never used   Substance and Sexual Activity    Alcohol use: Yes     Comment: 3 drinks monthly    Drug use: Not Currently              Review of Systems    Positive for stated complaint: post-op pain  Other systems are as noted in HPI.  Constitutional and vital signs reviewed.      All other systems reviewed and negative except as noted above.    Physical Exam     ED Triage Vitals [24]   BP (!) 151/99   Pulse 88   Resp 18   Temp 98.4 °F (36.9 °C)   Temp src Temporal   SpO2 96 %   O2 Device None (Room air)       Current:/85   Pulse 82   Temp 98.4 °F (36.9 °C) (Temporal)   Resp 18   SpO2 99%         Physical Exam  Vitals and nursing note reviewed.   Constitutional:       Appearance: Normal appearance. She is well-developed. She is not ill-appearing or diaphoretic.   HENT:      Head: Normocephalic and atraumatic.      Right Ear: External ear normal.      Left Ear: External ear normal.      Nose: Nose normal. No nasal deformity.      Mouth/Throat:      Lips:  Pink.   Eyes:      General: Lids are normal.      Extraocular Movements: Extraocular movements intact.   Pulmonary:      Effort: Pulmonary effort is normal. No respiratory distress.   Musculoskeletal:         General: No deformity. Normal range of motion.      Right foot: Normal capillary refill. Normal pulse.      Comments: Capillary refill is normal to the toes.  The lower extremity is in a cast which has been clamshelled.    Skin:     General: Skin is dry.      Coloration: Skin is not cyanotic or pale.   Neurological:      General: No focal deficit present.      Mental Status: She is alert and oriented to person, place, and time.      Gait: Gait is intact.   Psychiatric:         Attention and Perception: Attention normal.         Mood and Affect: Mood normal.         Speech: Speech normal.               ED Course   Labs Reviewed - No data to display                   MDM                                         Medical Decision Making  Differential diagnosis includes but is not limited to postoperative pain, tight cast, etc    Patient had surgery today, infection would be unlikely.  Patient saw the podiatrist and had the cast clamshell to relieve any pressure and is still having pain.  She was given some Dilaudid, ketorolac and some Norflex for possible muscular spasm in the ED.  She is to stop taking the hydrocodone and is given a prescription for Percocets.      Problems Addressed:  Postoperative pain: acute illness or injury    Amount and/or Complexity of Data Reviewed  Discussion of management or test interpretation with external provider(s): Spoke with Dr. Barney who did the surgery and reports that the surgery went well, there was no infection and that he clamshelled the cast.  He would like the patient to get some dilaudid.          Risk  Prescription drug management.  Parenteral controlled substances.  Risk Details: dilaudid        Disposition and Plan     Clinical Impression:  1. Postoperative pain          Disposition:  Discharge  2/28/2024 11:05 pm    Follow-up:  Ming Barney DPM  [address] [address]  [phone]    Follow up            Medications Prescribed:  Current Discharge Medication List        START taking these medications    Details   oxyCODONE-acetaminophen  MG Oral Tab Take 0.5-1 tablets by mouth every 6 (six) hours as needed for Pain.  Qty: 10 tablet, Refills: 0    Associated Diagnoses: Postoperative pain      Ketorolac Tromethamine 10 MG Oral Tab Take 1 tablet (10 mg total) by mouth every 6 (six) hours as needed for Pain.  Qty: 20 tablet, Refills: 0    Comments: Patient received ketorolac IV in the ED.

## 2024-03-01 ENCOUNTER — TELEPHONE (OUTPATIENT)
Dept: PODIATRY CLINIC | Facility: CLINIC | Age: 64
End: 2024-03-01

## 2024-03-01 NOTE — TELEPHONE ENCOUNTER
Procedure date:  How are you feeling?   Any bleeding?    Is the dressing dry & intact?   Level of pain?   Character of pain:   Onset of pain:  Aggravating factors:  Duration of pain:  Alleviating factors:  Are you taking the prescribed medication?   Are you following all of the PO instructions?     Other Comments:    Follow-up appt  date:     Pt was advised if they have any concerns after hours to call our office and they would be directed to on call physician.

## 2024-03-01 NOTE — TELEPHONE ENCOUNTER
Procedure date:2/8/2024  How are you feeling? /Same day as the surgery went to the ED for pain control  Any bleeding? / no   Is the dressing dry & intact? /yes  Level of pain? / 6  Character of pain: did not say.   Onset of pain:/day of surgery  Aggravating factors:/ going down 30 stairs  Duration of pain:/ until she elevates her leg and foot  Alleviating factors: no weight bearing      Are you taking the prescribed medication? / This is a very conflicting dilemma/ She has two narcotics and toradol at home and wants  more narcotics to cover the weekend. Until her appt. I explained that her insurance may not cover all those opiods in the past 2 days. She said she had been texting you so I hope you have an answer for her. I suggested 2 norco if she runs out of oxycodone.  She is very insistant  Are you following all of the PO instructions?/ appetite fine    Other Comments:She has 30 stairs from the bedroom to the kitchen and her kids will be leaving in the next few days    Follow-up appt  date:3/6/2024     Pt was advised if they have any concerns after hours to call our office and they would be directed to on call physician. / DONE

## 2024-03-06 ENCOUNTER — OFFICE VISIT (OUTPATIENT)
Dept: PODIATRY CLINIC | Facility: CLINIC | Age: 64
End: 2024-03-06
Payer: MEDICAID

## 2024-03-06 DIAGNOSIS — Z98.890 STATUS POST FOOT SURGERY: Primary | ICD-10-CM

## 2024-03-06 PROCEDURE — 99024 POSTOP FOLLOW-UP VISIT: CPT | Performed by: PODIATRIST

## 2024-03-08 ENCOUNTER — TELEPHONE (OUTPATIENT)
Dept: PODIATRY CLINIC | Facility: CLINIC | Age: 64
End: 2024-03-08

## 2024-03-08 NOTE — TELEPHONE ENCOUNTER
Called pt and she states she made an appt for 3/18 and she s/w Dr Barney already and he confirmed that 3/18 appt is ok and she does not require sooner appt. She will call back if any concerns.

## 2024-03-08 NOTE — TELEPHONE ENCOUNTER
Patient states that she is not able to keep Wednesday 3/13/24 post op, due to a  that she needs to attend, so the patient has schedule another appointment for Monday 3/18/24, which is first available. Should the patient be seen sooner around 12:30pm?

## 2024-03-10 NOTE — PROGRESS NOTES
Hui Harvey is a 63 year old female.   Chief Complaint   Patient presents with    Post-Op     Post op right foot- pain 6/10 at rest.          HPI:   Patient presents 1 week status post surgery she had communicated with me several times about severe pain still having fairly severe pain even 6 out of 10 despite being nonweightbearing in a cast.  At today's visit reviewed nurse's history as taken above, allergies medications and medical history as documented below.  All changes duly noted  Allergies: Patient has no active allergies.   Current Outpatient Medications   Medication Sig Dispense Refill    HYDROcodone-acetaminophen 5-325 MG Oral Tab Take 1 tablet by mouth every 6 (six) hours as needed for Pain. 30 tablet 0    amoxicillin clavulanate 875-125 MG Oral Tab Take 1 tablet by mouth 2 (two) times daily. 14 tablet 0    HYDROcodone-acetaminophen 5-325 MG Oral Tab Take 1 tablet by mouth every 6 (six) hours as needed for Pain. 30 tablet 0    Multiple Vitamins-Minerals (MULTI-VITAMIN/MINERALS) Oral Tab Take 1 tablet by mouth daily.      Pyridoxine HCl (VITAMIN B-6 OR) Take 1 tablet by mouth daily.      mirtazapine 15 MG Oral Tab Take 1 tablet (15 mg total) by mouth nightly.      QUEtiapine 25 MG Oral Tab Take 1 tablet (25 mg total) by mouth as needed.      LORazepam 1 MG Oral Tab Take 1 tablet (1 mg total) by mouth as needed for Anxiety.        Past Medical History:   Diagnosis Date    Anxiety state     Depression     Dry eye     Visual impairment     glasses      Past Surgical History:   Procedure Laterality Date          FOOT SURGERY Right     IMPLANT LEFT      IMPLANT RIGHT        Family History   Problem Relation Age of Onset    Heart Disorder Father       Social History     Socioeconomic History    Marital status: Single   Tobacco Use    Smoking status: Never    Smokeless tobacco: Never   Vaping Use    Vaping Use: Never used   Substance and Sexual Activity    Alcohol use: Yes     Comment: 3  drinks monthly    Drug use: Not Currently           REVIEW OF SYSTEMS:   Today reviewed systens as documented below  GENERAL HEALTH: feels well otherwise  SKIN: Refer to exam below  RESPIRATORY: denies shortness of breath with exertion  CARDIOVASCULAR: denies chest pain on exertion  GI: denies abdominal pain and denies heartburn  NEURO: denies headaches    EXAM:   There were no vitals taken for this visit.  GENERAL: well developed, well nourished, in no apparent distress  EXTREMITIES:   The cast was removed the dressing was removed she does have diffuse bruising around the area she has some shortening of the hallux as well.  Which I did discuss with her.  In addition to that she has a small blister in the first webspace which is superficial has no purulence.  ASSESSMENT AND PLAN:   Diagnoses and all orders for this visit:    Status post foot surgery        Plan: Aseptic dressing reapplied patient was placed in a knee-high cam boot.  Patient will stay off the foot and keep it elevated continue with all restrictions and follow-up with us again in 1 week for postop check possible suture removal and x-ray.  Patient must remain strict nonweightbearing.    The patient indicates understanding of these issues and agrees to the plan.    Ming Barney DPM

## 2024-03-18 ENCOUNTER — OFFICE VISIT (OUTPATIENT)
Dept: PODIATRY CLINIC | Facility: CLINIC | Age: 64
End: 2024-03-18
Payer: MEDICAID

## 2024-03-18 DIAGNOSIS — G89.18 POSTOPERATIVE PAIN OF EXTREMITY: Primary | ICD-10-CM

## 2024-03-18 DIAGNOSIS — M79.609 POSTOPERATIVE PAIN OF EXTREMITY: Primary | ICD-10-CM

## 2024-03-18 RX ORDER — TRAMADOL HYDROCHLORIDE 50 MG/1
50 TABLET ORAL EVERY 12 HOURS PRN
Qty: 30 TABLET | Refills: 0 | Status: SHIPPED | OUTPATIENT
Start: 2024-03-18

## 2024-03-18 NOTE — PROGRESS NOTES
Hui Harvey is a 63 year old female.   Chief Complaint   Patient presents with    Post-Op     2nd pov- right foot-rates pain 6.5/10 - electric shock pain-          HPI:   Patient returns to the clinic now 2-1/2 weeks status post surgery she missed her last appointment because of job interviews.  Her friend Baldo who identifies himself as an  presents with her.  At today's visit reviewed nurse's history as taken above, allergies medications and medical history as documented below.  All changes duly noted  Allergies: Patient has no active allergies.   Current Outpatient Medications   Medication Sig Dispense Refill    traMADol 50 MG Oral Tab Take 1 tablet (50 mg total) by mouth every 12 (twelve) hours as needed for Pain. 30 tablet 0    HYDROcodone-acetaminophen 5-325 MG Oral Tab Take 1 tablet by mouth every 6 (six) hours as needed for Pain. 30 tablet 0    HYDROcodone-acetaminophen 5-325 MG Oral Tab Take 1 tablet by mouth every 6 (six) hours as needed for Pain. 30 tablet 0    Multiple Vitamins-Minerals (MULTI-VITAMIN/MINERALS) Oral Tab Take 1 tablet by mouth daily.      Pyridoxine HCl (VITAMIN B-6 OR) Take 1 tablet by mouth daily.      mirtazapine 15 MG Oral Tab Take 1 tablet (15 mg total) by mouth nightly.      QUEtiapine 25 MG Oral Tab Take 1 tablet (25 mg total) by mouth as needed.      amoxicillin clavulanate 875-125 MG Oral Tab Take 1 tablet by mouth 2 (two) times daily. 14 tablet 0    LORazepam 1 MG Oral Tab Take 1 tablet (1 mg total) by mouth as needed for Anxiety.        Past Medical History:   Diagnosis Date    Anxiety state     Depression     Dry eye     Visual impairment     glasses      Past Surgical History:   Procedure Laterality Date          FOOT SURGERY Right     IMPLANT LEFT      IMPLANT RIGHT        Family History   Problem Relation Age of Onset    Heart Disorder Father       Social History     Socioeconomic History    Marital status: Single   Tobacco Use    Smoking  status: Never    Smokeless tobacco: Never   Vaping Use    Vaping Use: Never used   Substance and Sexual Activity    Alcohol use: Yes     Comment: 3 drinks monthly    Drug use: Not Currently           REVIEW OF SYSTEMS:   Today reviewed systens as documented below  GENERAL HEALTH: feels well otherwise  SKIN: Refer to exam below  RESPIRATORY: denies shortness of breath with exertion  CARDIOVASCULAR: denies chest pain on exertion  GI: denies abdominal pain and denies heartburn  NEURO: denies headaches    EXAM:   There were no vitals taken for this visit.  GENERAL: well developed, well nourished, in no apparent distress  EXTREMITIES:     Is well coapted sutures removed no dehiscence no erythema no edema no purulence is [id number] edema is markedly decreased.  Motion palpated at the fusion site patient still has some odd sensations here and there which I contribute to swelling which is still there but not totally gone.  X-rays were taken taken 3 views they show the fusion site to be well-approximated small little bone fragments next to it not uncommon explained that to the patient and her friend who accompanied her.  ASSESSMENT AND PLAN:   Diagnoses and all orders for this visit:    Postoperative pain of extremity  -     traMADol 50 MG Oral Tab; Take 1 tablet (50 mg total) by mouth every 12 (twelve) hours as needed for Pain.    Other orders  -     [facility] AMB POD XR - RT FOOT 2 VIEWS(AP, LATERAL)WT BEARING        Plan: The patient states that she has been walking on the heel without the boot she cannot seem to manage using the boot she tried to use the shorter boot that she had from her previous cheilectomy encouraged her not to do that because that boot will not immobilize the ankle and therefore will allow her to put pressure on the ball of her foot which could disrupt the surgery.  Recommended she continue in the tall boot for right now we will see her in another 3 or 4 weeks see how she is doing.  But she can call me  if she has any questions or text me.  Applied an aseptic dressing which she can remove in a few days and start using a nylon anklet we did dispense 2 of them to her today.  In addition to that if she notices any problems she will come to the clinic sooner but I highly encouraged her not to walk barefoot on the heel as she has been doing.  The patient indicates understanding of these issues and agrees to the plan.    Ming Barney DPM

## 2024-04-25 ENCOUNTER — OFFICE VISIT (OUTPATIENT)
Dept: PODIATRY CLINIC | Facility: CLINIC | Age: 64
End: 2024-04-25
Payer: MEDICAID

## 2024-04-25 DIAGNOSIS — Z98.890 STATUS POST FOOT SURGERY: Primary | ICD-10-CM

## 2024-04-25 PROCEDURE — 99024 POSTOP FOLLOW-UP VISIT: CPT | Performed by: PODIATRIST

## 2024-04-26 DIAGNOSIS — G89.18 POSTOPERATIVE PAIN OF EXTREMITY: ICD-10-CM

## 2024-04-26 DIAGNOSIS — M79.609 POSTOPERATIVE PAIN OF EXTREMITY: ICD-10-CM

## 2024-04-26 RX ORDER — TRAMADOL HYDROCHLORIDE 50 MG/1
50 TABLET ORAL EVERY 12 HOURS PRN
Qty: 30 TABLET | Refills: 0 | Status: SHIPPED | OUTPATIENT
Start: 2024-04-26

## 2024-04-26 RX ORDER — TRAMADOL HYDROCHLORIDE 50 MG/1
50 TABLET ORAL EVERY 12 HOURS PRN
Qty: 30 TABLET | Refills: 0 | OUTPATIENT
Start: 2024-04-26

## 2024-04-26 NOTE — PROGRESS NOTES
Hui Harvey is a 63 year old female.   Chief Complaint   Patient presents with    Post-Op     R foot sx on 2024- rates pain 6/10 depends w/ activity-  per pt she hit her R 2nd toe in the stool bar yesterday- stated it's looks bruised but per pt it looks bruised even before the injury         HPI:   Patient returns to the clinic still having some postop pain not severe she is complaining of numbness and some swelling.  At today's visit reviewed nurse's history as taken above, allergies medications and medical history as documented below.  All changes duly noted  Allergies: Patient has no active allergies.   Current Outpatient Medications   Medication Sig Dispense Refill    traMADol 50 MG Oral Tab Take 1 tablet (50 mg total) by mouth every 12 (twelve) hours as needed for Pain. 30 tablet 0    traMADol 50 MG Oral Tab Take 1 tablet (50 mg total) by mouth every 12 (twelve) hours as needed for Pain. 30 tablet 0    HYDROcodone-acetaminophen 5-325 MG Oral Tab Take 1 tablet by mouth every 6 (six) hours as needed for Pain. 30 tablet 0    amoxicillin clavulanate 875-125 MG Oral Tab Take 1 tablet by mouth 2 (two) times daily. 14 tablet 0    HYDROcodone-acetaminophen 5-325 MG Oral Tab Take 1 tablet by mouth every 6 (six) hours as needed for Pain. 30 tablet 0    Multiple Vitamins-Minerals (MULTI-VITAMIN/MINERALS) Oral Tab Take 1 tablet by mouth daily.      Pyridoxine HCl (VITAMIN B-6 OR) Take 1 tablet by mouth daily.      mirtazapine 15 MG Oral Tab Take 1 tablet (15 mg total) by mouth nightly.      LORazepam 1 MG Oral Tab Take 1 tablet (1 mg total) by mouth as needed for Anxiety.      QUEtiapine 25 MG Oral Tab Take 1 tablet (25 mg total) by mouth as needed.        Past Medical History:    Anxiety state    Depression    Dry eye    Visual impairment    glasses      Past Surgical History:   Procedure Laterality Date          Foot surgery Right     Implant left      Implant right        Family History    Problem Relation Age of Onset    Heart Disorder Father       Social History     Socioeconomic History    Marital status: Single   Tobacco Use    Smoking status: Never    Smokeless tobacco: Never   Vaping Use    Vaping status: Never Used   Substance and Sexual Activity    Alcohol use: Yes     Comment: 3 drinks monthly    Drug use: Not Currently           REVIEW OF SYSTEMS:   Today reviewed systens as documented below  GENERAL HEALTH: feels well otherwise  SKIN: Refer to exam below  RESPIRATORY: denies shortness of breath with exertion  CARDIOVASCULAR: denies chest pain on exertion  GI: denies abdominal pain and denies heartburn  NEURO: denies headaches    EXAM:   There were no vitals taken for this visit.  GENERAL: well developed, well nourished, in no apparent distress  EXTREMITIES:   Skin incision is healing uneventfully there is no sign of infection mild swelling still present but remarkably improved since the last time I saw him.  It is progressing.      ASSESSMENT AND PLAN:   Diagnoses and all orders for this visit:    Status post foot surgery  -     traMADol 50 MG Oral Tab; Take 1 tablet (50 mg total) by mouth every 12 (twelve) hours as needed for Pain.    Other orders  -     [facility] AMB POD XR - RT FOOT 3 VIEWS(AP,LAT,OBLIQUE)WT BEARING        Plan: Patient can ambulate in an athletic shoe x-rays were taken today AP and lateral and oblique plate is well adhered to the bony surface no displacement of the fusion site is noted and is progressing towards healing.  Patient can ambulate in an athletic shoe at this time on the heel or side of her foot and progressed to full weightbearing after a few weeks.  For right now if she notices any swelling or problems she will go back to the boot otherwise I will see her in 1 month for a checkup refill tramadol today.    The patient indicates understanding of these issues and agrees to the plan.    Ming Barney DPM

## 2024-05-25 ENCOUNTER — OFFICE VISIT (OUTPATIENT)
Dept: PODIATRY CLINIC | Facility: CLINIC | Age: 64
End: 2024-05-25

## 2024-05-25 DIAGNOSIS — Z98.890 STATUS POST FOOT SURGERY: Primary | ICD-10-CM

## 2024-05-25 PROCEDURE — 99024 POSTOP FOLLOW-UP VISIT: CPT | Performed by: PODIATRIST

## 2024-06-02 NOTE — PROGRESS NOTES
Hui Harvey is a 63 year old female.   Chief Complaint   Patient presents with    Post-Op     Right foot- swollen- numbness- rates pain 5/10 - feels like there is a rope on 2nd and 3rd toe- 2nd toe is black and blue- taking a beating from walking differently-swelling goes up and down- patient states she is out of the boot and elevate the foot          HPI:   Returns to clinic still having some discomfort up to 5 out of 10 she feels like her second toe is black and blue and taken of bleeding from walking differently.  At today's visit reviewed nurse's history as taken above, allergies medications and medical history as documented below.  All changes duly noted  Allergies: Patient has no active allergies.   Current Outpatient Medications   Medication Sig Dispense Refill    traMADol 50 MG Oral Tab Take 1 tablet (50 mg total) by mouth every 12 (twelve) hours as needed for Pain. 30 tablet 0    HYDROcodone-acetaminophen 5-325 MG Oral Tab Take 1 tablet by mouth every 6 (six) hours as needed for Pain. 30 tablet 0    HYDROcodone-acetaminophen 5-325 MG Oral Tab Take 1 tablet by mouth every 6 (six) hours as needed for Pain. 30 tablet 0    Multiple Vitamins-Minerals (MULTI-VITAMIN/MINERALS) Oral Tab Take 1 tablet by mouth daily.      Pyridoxine HCl (VITAMIN B-6 OR) Take 1 tablet by mouth daily.      mirtazapine 15 MG Oral Tab Take 1 tablet (15 mg total) by mouth nightly.      QUEtiapine 25 MG Oral Tab Take 1 tablet (25 mg total) by mouth as needed.      traMADol 50 MG Oral Tab Take 1 tablet (50 mg total) by mouth every 12 (twelve) hours as needed for Pain. (Patient not taking: Reported on 5/25/2024) 30 tablet 0    amoxicillin clavulanate 875-125 MG Oral Tab Take 1 tablet by mouth 2 (two) times daily. 14 tablet 0    LORazepam 1 MG Oral Tab Take 1 tablet (1 mg total) by mouth as needed for Anxiety.        Past Medical History:    Anxiety state    Depression    Dry eye    Visual impairment    glasses      Past Surgical  History:   Procedure Laterality Date          Foot surgery Right     Implant left      Implant right        Family History   Problem Relation Age of Onset    Heart Disorder Father       Social History     Socioeconomic History    Marital status: Single   Tobacco Use    Smoking status: Never    Smokeless tobacco: Never   Vaping Use    Vaping status: Never Used   Substance and Sexual Activity    Alcohol use: Yes     Comment: 3 drinks monthly    Drug use: Not Currently           REVIEW OF SYSTEMS:   Today reviewed systens as documented below  GENERAL HEALTH: feels well otherwise  SKIN: Refer to exam below  RESPIRATORY: denies shortness of breath with exertion  CARDIOVASCULAR: denies chest pain on exertion  GI: denies abdominal pain and denies heartburn  NEURO: denies headaches    EXAM:   There were no vitals taken for this visit.  GENERAL: well developed, well nourished, in no apparent distress  EXTREMITIES:   The skin incision is well-healed there is no sign of an infection.  The fusion site was palpated there is no motion noted.  X-rays were taken 3 views reviewed with the patient fusion is in good's spot it is not distracted it is healing well the resorption at the fusion site the plate and screws are not elevating 1 screw is a little elevated proximally but overall they are all in good position  ASSESSMENT AND PLAN:   There are no diagnoses linked to this encounter.    Plan: Patient can ice if she wishes to but does not need to anymore she will wear a comfortable athletic shoe at this time progress to heels possibly in a month she can ambulate as tolerated she can walk 1 mile distance while wearing a good athletic shoe she can use her [employer] bike.  Follow-up with us again maybe in another month or 2.    The patient indicates understanding of these issues and agrees to the plan.    Ming Barney DPM

## 2024-06-14 DIAGNOSIS — Z98.890 STATUS POST FOOT SURGERY: ICD-10-CM

## 2024-06-15 RX ORDER — TRAMADOL HYDROCHLORIDE 50 MG/1
50 TABLET ORAL EVERY 12 HOURS PRN
Qty: 30 TABLET | Refills: 0 | Status: SHIPPED | OUTPATIENT
Start: 2024-06-15

## 2024-06-17 ENCOUNTER — TELEPHONE (OUTPATIENT)
Dept: PODIATRY CLINIC | Facility: CLINIC | Age: 64
End: 2024-06-17

## 2024-06-17 ENCOUNTER — PATIENT MESSAGE (OUTPATIENT)
Dept: PODIATRY CLINIC | Facility: CLINIC | Age: 64
End: 2024-06-17

## 2024-06-17 DIAGNOSIS — M79.609 POSTOPERATIVE PAIN OF EXTREMITY: ICD-10-CM

## 2024-06-17 DIAGNOSIS — Z98.890 STATUS POST FOOT SURGERY: ICD-10-CM

## 2024-06-17 DIAGNOSIS — Z98.890 STATUS POST FOOT SURGERY: Primary | ICD-10-CM

## 2024-06-17 DIAGNOSIS — G89.18 POSTOPERATIVE PAIN OF EXTREMITY: ICD-10-CM

## 2024-06-17 RX ORDER — TRAMADOL HYDROCHLORIDE 50 MG/1
50 TABLET ORAL EVERY 12 HOURS PRN
Qty: 30 TABLET | Refills: 0 | OUTPATIENT
Start: 2024-06-17

## 2024-06-17 NOTE — TELEPHONE ENCOUNTER
Tramadol was approved on 6/15 but rx did not go through as script printed, but should be electronic. Please resend.,

## 2024-06-18 RX ORDER — TRAMADOL HYDROCHLORIDE 50 MG/1
50 TABLET ORAL EVERY 12 HOURS PRN
Qty: 30 TABLET | Refills: 0 | Status: SHIPPED | OUTPATIENT
Start: 2024-06-18

## 2024-06-18 NOTE — TELEPHONE ENCOUNTER
From: Hui Harvey  To: Ming Barney  Sent: 6/17/2024 3:00 PM CDT  Subject: New Ins for Tramadol Refill Request     Hello Dr. Barney,    Thanks so much for calling in the tramadol. It was denied because I have new insurance. Here’s the new information. Please call me if you have any questions if not run it through my new insurance.    Hui Andrews”    Mbr ID for [facility] Benefits:  [id number]    RxBIN: [id number]  RxGRP: [id number]    Please confirm receipt when this goes through please or call me.    -Hui  [phone]

## 2024-06-19 RX ORDER — TRAMADOL HYDROCHLORIDE 50 MG/1
50 TABLET ORAL EVERY 12 HOURS PRN
Qty: 30 TABLET | Refills: 0 | OUTPATIENT
Start: 2024-06-19

## 2024-07-25 DIAGNOSIS — G89.18 POSTOPERATIVE PAIN OF EXTREMITY: ICD-10-CM

## 2024-07-25 DIAGNOSIS — M79.609 POSTOPERATIVE PAIN OF EXTREMITY: ICD-10-CM

## 2024-07-26 RX ORDER — TRAMADOL HYDROCHLORIDE 50 MG/1
50 TABLET ORAL EVERY 12 HOURS PRN
Qty: 30 TABLET | Refills: 0 | Status: SHIPPED | OUTPATIENT
Start: 2024-07-26

## 2024-08-19 ENCOUNTER — TELEPHONE (OUTPATIENT)
Dept: PODIATRY CLINIC | Facility: CLINIC | Age: 64
End: 2024-08-19

## 2024-08-19 DIAGNOSIS — Z98.890 STATUS POST FOOT SURGERY: ICD-10-CM

## 2024-08-19 DIAGNOSIS — M79.609 POSTOPERATIVE PAIN OF EXTREMITY: Primary | ICD-10-CM

## 2024-08-19 DIAGNOSIS — G89.18 POSTOPERATIVE PAIN OF EXTREMITY: Primary | ICD-10-CM

## 2024-08-19 RX ORDER — TRAMADOL HYDROCHLORIDE 50 MG/1
50 TABLET ORAL EVERY 12 HOURS PRN
Qty: 30 TABLET | Refills: 0 | Status: SHIPPED | OUTPATIENT
Start: 2024-08-19

## 2024-08-19 NOTE — TELEPHONE ENCOUNTER
Patient called stated she left her prescription in [place] at a hotel she had called and they were unable to locate patient gets postoperative pain at night desires a refill

## (undated) DEVICE — Device

## (undated) DEVICE — BANDAGE,GAUZE,BULKEE II,4.5"X4.1YD,STRL: Brand: MEDLINE

## (undated) DEVICE — 3M™ IOBAN™ 2 ANTIMICROBIAL INCISE DRAPE 6650EZ: Brand: IOBAN™ 2

## (undated) DEVICE — DISPOSABLE TOURNIQUET CUFF SINGLE BLADDER, DUAL PORT AND QUICK CONNECT CONNECTOR: Brand: COLOR CUFF

## (undated) DEVICE — SOLUTION IRRIG 1000ML 0.9% NACL USP BTL

## (undated) DEVICE — PRECISION OFFSET (9.0 X 0.51 X 25.0MM)

## (undated) DEVICE — UNDYED BRAIDED (POLYGLACTIN 910), SYNTHETIC ABSORBABLE SUTURE: Brand: COATED VICRYL

## (undated) DEVICE — STANDARD HYPODERMIC NEEDLE,POLYPROPYLENE HUB: Brand: MONOJECT

## (undated) DEVICE — STERILE TETRA-FLEX CF, ELASTIC BANDAGE, 4" X 5.5YD: Brand: TETRA-FLEX™CF

## (undated) DEVICE — SUT ETHLN 3-0 30IN FS-1 NABSRB BLK 24MM 3/8 C

## (undated) DEVICE — C-ARMOR C-ARM EQUIPMENT COVERS CLEAR STERILE UNIVERSAL FIT 12 PER CASE: Brand: C-ARMOR

## (undated) DEVICE — CONCAVE SURFACING REAMER

## (undated) DEVICE — GAMMEX® NON-LATEX PI ORTHO SIZE 7.5, STERILE POLYISOPRENE POWDER-FREE SURGICAL GLOVE: Brand: GAMMEX

## (undated) DEVICE — CONVEX REAMER

## (undated) DEVICE — INTENDED FOR TISSUE SEPARATION, AND OTHER PROCEDURES THAT REQUIRE A SHARP SURGICAL BLADE TO PUNCTURE OR CUT.: Brand: BARD-PARKER ® STAINLESS STEEL BLADES

## (undated) DEVICE — BIT DRL 2.5X60MM NONLOCKING FOR 3.5MM

## (undated) DEVICE — PACK CDS LOWER EXTREMITY

## (undated) DEVICE — SYRINGE MED 10ML LL TIP W/O SFTY DISP

## (undated) DEVICE — SUTURE PROL SZ 3-0 L18IN NONABSORB BLU